# Patient Record
Sex: FEMALE | Race: WHITE | NOT HISPANIC OR LATINO | URBAN - METROPOLITAN AREA
[De-identification: names, ages, dates, MRNs, and addresses within clinical notes are randomized per-mention and may not be internally consistent; named-entity substitution may affect disease eponyms.]

---

## 2020-08-10 ENCOUNTER — TRANSCRIBE ORDERS (OUTPATIENT)
Dept: ADMINISTRATIVE | Facility: HOSPITAL | Age: 60
End: 2020-08-10

## 2020-08-10 DIAGNOSIS — Z12.31 VISIT FOR SCREENING MAMMOGRAM: Primary | ICD-10-CM

## 2020-08-28 ENCOUNTER — APPOINTMENT (OUTPATIENT)
Dept: MAMMOGRAPHY | Facility: HOSPITAL | Age: 60
End: 2020-08-28

## 2022-11-28 ENCOUNTER — OFFICE VISIT (OUTPATIENT)
Dept: ORTHOPEDIC SURGERY | Facility: CLINIC | Age: 62
End: 2022-11-28

## 2022-11-28 VITALS — WEIGHT: 175.4 LBS | BODY MASS INDEX: 29.94 KG/M2 | HEIGHT: 64 IN | TEMPERATURE: 97.6 F | RESPIRATION RATE: 12 BRPM

## 2022-11-28 DIAGNOSIS — M54.16 LUMBAR RADICULOPATHY: ICD-10-CM

## 2022-11-28 DIAGNOSIS — M43.16 SPONDYLOLISTHESIS OF LUMBAR REGION: Primary | ICD-10-CM

## 2022-11-28 DIAGNOSIS — M54.50 LUMBAR SPINE PAIN: ICD-10-CM

## 2022-11-28 PROBLEM — E66.9 OBESITY (BMI 30.0-34.9): Status: ACTIVE | Noted: 2022-11-28

## 2022-11-28 PROCEDURE — 72100 X-RAY EXAM L-S SPINE 2/3 VWS: CPT | Performed by: NURSE PRACTITIONER

## 2022-11-28 PROCEDURE — 99203 OFFICE O/P NEW LOW 30 MIN: CPT | Performed by: NURSE PRACTITIONER

## 2022-11-28 RX ORDER — CLONAZEPAM 1 MG/1
TABLET ORAL EVERY 8 HOURS SCHEDULED
COMMUNITY

## 2022-11-28 RX ORDER — CYCLOBENZAPRINE HCL 5 MG
TABLET ORAL
COMMUNITY
Start: 2022-11-16

## 2022-11-28 RX ORDER — LISINOPRIL 20 MG/1
TABLET ORAL
COMMUNITY
Start: 2022-09-07

## 2022-11-28 RX ORDER — VENLAFAXINE HYDROCHLORIDE 150 MG/1
CAPSULE, EXTENDED RELEASE ORAL
COMMUNITY

## 2022-11-28 RX ORDER — HYDROCODONE BITARTRATE AND ACETAMINOPHEN 7.5; 325 MG/1; MG/1
TABLET ORAL
COMMUNITY
Start: 2022-11-18

## 2022-11-28 RX ORDER — BUPRENORPHINE AND NALOXONE 8; 2 MG/1; MG/1
1 FILM, SOLUBLE BUCCAL; SUBLINGUAL
COMMUNITY

## 2022-11-28 RX ORDER — PREDNISONE 20 MG/1
TABLET ORAL
COMMUNITY
Start: 2022-11-18

## 2022-11-28 RX ORDER — IBUPROFEN 800 MG/1
TABLET ORAL EVERY 8 HOURS SCHEDULED
COMMUNITY

## 2022-11-28 NOTE — PROGRESS NOTES
Patient Name: Regla Lugo   YOB: 1960  Referring Primary Care Physician: Sulaiman Torrez MD      Chief Complaint:    Chief Complaint   Patient presents with   • Lumbar Spine - Pain, Initial Evaluation        HPI:  Regla Lugo is a 62 y.o. female who presents to [facility] for evaluation of chronic low back pain exacerbated with a reported work injury in September. This is a new patient to the practice and new to me.  Prior pertinent records were reviewed.  For the chronic low back pain, she has had epidural injections with Dr. Morin from 2012 2018.  With this more recent injury, she followed up with her PCP and was put on light duty.  She said initially the pain was worse on the right low lumbar paraspinal region.  After 1 month of light duty she returned to regular duty over the past month and has now developed radicular pain into the left buttock and posterior lateral aspect of the left lower extremity terminating in the calf.  She has been doing a home exercise program as she cannot afford physical therapy.  She does get occasional paresthesias in the left lower extremity but denies any weakness or bowel or bladder dysfunction.    PFSH:  See attached    ROS: As per HPI, otherwise negative    Objective:    Vitals:    11/28/22 0945   Resp: 12   Temp: 97.6 °F (36.4 °C)     Body mass index is 30.11 kg/m².      Physical Exam  Constitutional:       Appearance: She is well-developed and well-nourished.   Eyes:      General: No scleral icterus.  Skin:     General: Skin is intact.   Neurological:      Mental Status: She is alert.   Psychiatric:         Mood and Affect: Mood and affect normal.       Spine Musculoskeletal Exam    Gait    Limp: left    Palpation    Thoracolumbar    Right      Muscle tone: normal    Left      Muscle tone: normal    Strength    Thoracolumbar    Thoracolumbar motor exam is normal.     Sensory    Thoracolumbar    Thoracolumbar sensation  is normal.    Spine sensation additional comments: Subjective paraesthesia left lateral leg    Special Tests    Thoracolumbar      Right      SLR: no back or leg pain      Left      SLR: pain radiates to left leg      SLR pain at: 70 degrees    General      Constitutional: mildly obese, well-developed and well-nourished    Scleral icterus: no    Labored breathing: no    Psychiatric: normal mood and affect and no acute distress    Neurological: alert and oriented x3    Skin: intact        IMAGING:     Indication: pain related symptoms,  Views: 2V AP&LAT lumbar  Findings: Anterolisthesis of L5 compared to L4 and S1, retrolisthesis of L1 on L2 and L2 on L3, disc base narrowing most prominent L1-2 and L4-5 as well as L5-S1.  No fractures identified.  Comparison views: None    Assessment:           Diagnoses and all orders for this visit:    1. Spondylolisthesis of lumbar region (Primary)  -     MRI Lumbar Spine Without Contrast; Future    2. Lumbar spine pain  -     XR Spine Lumbar 2 or 3 View    3. Lumbar radiculopathy  -     MRI Lumbar Spine Without Contrast; Future             Plan:  She reports a history of chronic low back pain dating about 10 years with recent exacerbation and now radicular pain to the left lower extremity which she relates to a work injury.  She was under the impression this visit would be covered under work comp, but is now unsure.  If this is not covered under work, she will be a self-pay.  We will order lumbar MRI at [facility] to evaluate for nerve root compression.  She does not have loss of nerve function on exam, however does have persistence of symptoms despite medical management including HEP over the past 2 months.  We will have her follow-up after MRI.  If there is anything of surgical concern, we will have to refer her to [facility] as Dr. Yang is retiring next month.  If there are no surgical concerns, may refer her to physical medicine rehab to direct none operative  treatment.    Return for After radiographic tests.

## 2022-11-29 ENCOUNTER — TELEPHONE (OUTPATIENT)
Dept: ORTHOPEDIC SURGERY | Facility: CLINIC | Age: 62
End: 2022-11-29

## 2022-11-29 ENCOUNTER — PATIENT ROUNDING (BHMG ONLY) (OUTPATIENT)
Dept: ORTHOPEDIC SURGERY | Facility: CLINIC | Age: 62
End: 2022-11-29

## 2022-11-29 NOTE — PROGRESS NOTES
A Frankly Chat Message has been sent to the patient for PATIENT ROUNDING with [facility]